# Patient Record
Sex: FEMALE | Race: ASIAN | NOT HISPANIC OR LATINO | Employment: UNEMPLOYED | URBAN - METROPOLITAN AREA
[De-identification: names, ages, dates, MRNs, and addresses within clinical notes are randomized per-mention and may not be internally consistent; named-entity substitution may affect disease eponyms.]

---
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
## 2020-01-02 DIAGNOSIS — N94.6 DYSMENORRHEA IN ADOLESCENT: ICD-10-CM

## 2020-01-03 RX ORDER — NORGESTIMATE AND ETHINYL ESTRADIOL
KIT
Qty: 28 TABLET | Refills: 0 | Status: SHIPPED | OUTPATIENT
Start: 2020-01-03 | End: 2020-01-27

## 2020-01-27 DIAGNOSIS — N94.6 DYSMENORRHEA IN ADOLESCENT: ICD-10-CM

## 2020-01-27 RX ORDER — NORGESTIMATE AND ETHINYL ESTRADIOL
KIT
Qty: 28 TABLET | Refills: 0 | Status: SHIPPED | OUTPATIENT
Start: 2020-01-27 | End: 2020-02-24

## 2020-01-29 DIAGNOSIS — N94.6 DYSMENORRHEA IN ADOLESCENT: ICD-10-CM

## 2020-01-29 RX ORDER — NORGESTIMATE AND ETHINYL ESTRADIOL
KIT
Qty: 28 TABLET | Refills: 0 | OUTPATIENT
Start: 2020-01-29

## 2020-02-24 DIAGNOSIS — N94.6 DYSMENORRHEA IN ADOLESCENT: ICD-10-CM

## 2020-02-24 RX ORDER — NORGESTIMATE AND ETHINYL ESTRADIOL
KIT
Qty: 28 TABLET | Refills: 0 | Status: SHIPPED | OUTPATIENT
Start: 2020-02-24 | End: 2020-03-25 | Stop reason: SDUPTHER

## 2020-03-24 DIAGNOSIS — N94.6 DYSMENORRHEA IN ADOLESCENT: ICD-10-CM

## 2020-03-25 RX ORDER — NORGESTIMATE AND ETHINYL ESTRADIOL
KIT
Qty: 28 TABLET | Refills: 0 | Status: SHIPPED | OUTPATIENT
Start: 2020-03-25 | End: 2020-04-20

## 2020-04-20 DIAGNOSIS — N94.6 DYSMENORRHEA IN ADOLESCENT: ICD-10-CM

## 2020-04-20 RX ORDER — NORGESTIMATE AND ETHINYL ESTRADIOL
KIT
Qty: 90 TABLET | Refills: 1 | Status: SHIPPED | OUTPATIENT
Start: 2020-04-20 | End: 2020-10-05

## 2020-10-03 DIAGNOSIS — N94.6 DYSMENORRHEA IN ADOLESCENT: ICD-10-CM

## 2020-10-05 RX ORDER — NORGESTIMATE AND ETHINYL ESTRADIOL
KIT
Qty: 28 TABLET | Refills: 0 | Status: SHIPPED | OUTPATIENT
Start: 2020-10-05 | End: 2020-11-02 | Stop reason: SDUPTHER

## 2020-10-29 DIAGNOSIS — N94.6 DYSMENORRHEA IN ADOLESCENT: Primary | ICD-10-CM

## 2020-11-02 RX ORDER — NORGESTIMATE AND ETHINYL ESTRADIOL
KIT
Qty: 90 TABLET | Refills: 3 | Status: SHIPPED | OUTPATIENT
Start: 2020-11-02 | End: 2021-10-04

## 2020-11-02 NOTE — TELEPHONE ENCOUNTER
Caller: Loni Ortiz    Relationship: Self    Best call back number:[phone]  Medication needed:   Requested Prescriptions     Pending Prescriptions Disp Refills   • Tri-Lo-Sheridan 0.18/0.215/0.25 MG-25 MCG per tablet [Pharmacy Med Name: TRI-LO-SHERIDAN TABLET] 28 tablet 0     Sig: TAKE ONE TABLET BY MOUTH DAILY       When do you need the refill by: ASAP    What details did the patient provide when requesting the medication: OUT OF MEDICATION       Does the patient have less than a 3 day supply:  [x] Yes  [] No    What is the patient's preferred pharmacy: [facility] [address] [facility] [address] - [phone]  - [phone] FX

## 2021-10-01 DIAGNOSIS — N94.6 DYSMENORRHEA IN ADOLESCENT: ICD-10-CM

## 2021-10-04 RX ORDER — NORGESTIMATE AND ETHINYL ESTRADIOL
KIT
Qty: 28 TABLET | Refills: 11 | Status: SHIPPED | OUTPATIENT
Start: 2021-10-04 | End: 2022-10-17 | Stop reason: SDUPTHER

## 2021-10-05 ENCOUNTER — OFFICE VISIT (OUTPATIENT)
Dept: FAMILY MEDICINE CLINIC | Facility: CLINIC | Age: 21
End: 2021-10-05

## 2021-10-05 VITALS
OXYGEN SATURATION: 98 % | TEMPERATURE: 98.6 F | WEIGHT: 136 LBS | SYSTOLIC BLOOD PRESSURE: 122 MMHG | BODY MASS INDEX: 25.03 KG/M2 | HEIGHT: 62 IN | HEART RATE: 72 BPM | DIASTOLIC BLOOD PRESSURE: 80 MMHG | RESPIRATION RATE: 18 BRPM

## 2021-10-05 DIAGNOSIS — Z00.00 ANNUAL PHYSICAL EXAM: Primary | ICD-10-CM

## 2021-10-05 DIAGNOSIS — Z11.1 VISIT FOR TB SKIN TEST: ICD-10-CM

## 2021-10-05 DIAGNOSIS — Z11.59 NEED FOR HEPATITIS C SCREENING TEST: ICD-10-CM

## 2021-10-05 DIAGNOSIS — Z11.8 SCREENING FOR CHLAMYDIAL DISEASE: ICD-10-CM

## 2021-10-05 PROBLEM — M30.0 POLYARTERITIS NODOSA: Status: ACTIVE | Noted: 2021-10-05

## 2021-10-05 PROBLEM — R01.1 HEART MURMUR: Status: ACTIVE | Noted: 2021-10-05

## 2021-10-05 PROCEDURE — 99395 PREV VISIT EST AGE 18-39: CPT | Performed by: NURSE PRACTITIONER

## 2021-10-05 NOTE — PROGRESS NOTES
"     Follow Up Office Note     Patient Name: Loni Ortiz  : 2000   MRN: [id number]     Chief Complaint:    Chief Complaint   Patient presents with   • Annual Exam       History of Present Illness: Loni Ortiz is a 20 y.o. female who presents today for annual physical exam.  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 She is also needing a TB skin test for employment. She has no complaints or health concerns today.  Patient describes her overall health as good.      Subjective      Review of Systems:   Review of Systems   Constitutional: Negative.    HENT: Negative.    Eyes: Negative.    Respiratory: Negative.    Cardiovascular: Negative.    Gastrointestinal: Negative.    Genitourinary: Negative.    Skin: Negative.    Allergic/Immunologic: Negative.    Neurological: Negative.    Hematological: Negative for adenopathy.   Psychiatric/Behavioral: Negative.  Negative for dysphoric mood and sleep disturbance. The patient is not nervous/anxious.         Past Medical History: History reviewed. No pertinent past medical history.      Medications:     Current Outpatient Medications:   •  albuterol (PROVENTIL HFA;VENTOLIN HFA) 108 (90 Base) MCG/ACT inhaler, Inhale 2 puffs Every 4 (Four) Hours As Needed for Wheezing (30 min prior to exercise)., Disp: 18 g, Rfl: 3  •  Tri-Lo-Sheridan 0.18/0.215/0.25 MG-25 MCG per tablet, TAKE ONE TABLET BY MOUTH DAILY, Disp: 28 tablet, Rfl: 11    Allergies:   No Known Allergies    Patient's last menstrual period was 2021 (approximate).  Objective     Physical Exam:  Vital Signs:   Vitals:    10/05/21 1135   BP: 122/80   Pulse: 72   Resp: 18   Temp: 98.6 °F (37 °C)   SpO2: 98%   Weight: 61.7 kg (136 lb)   Height: 157.5 cm (62\")     Body mass index is 24.87 kg/m².     Physical Exam  Vitals and nursing note reviewed. Exam conducted with a chaperone present.   Constitutional:       General: She is not in acute distress.     " Appearance: Normal appearance. She is well-developed and well-groomed. She is not ill-appearing, toxic-appearing or diaphoretic.   HENT:      Head: Normocephalic and atraumatic.      Right Ear: Tympanic membrane, ear canal and external ear normal.      Left Ear: Tympanic membrane, ear canal and external ear normal.      Nose: Nose normal.      Mouth/Throat:      Lips: Pink.      Mouth: Mucous membranes are moist.      Pharynx: Oropharynx is clear. Uvula midline. No posterior oropharyngeal erythema.   Eyes:      Pupils: Pupils are equal, round, and reactive to light.   Neck:      Thyroid: No thyroid mass, thyromegaly or thyroid tenderness.   Cardiovascular:      Rate and Rhythm: Normal rate and regular rhythm.      Pulses: Normal pulses.      Heart sounds: Normal heart sounds, S1 normal and S2 normal. No murmur heard.     Pulmonary:      Effort: Pulmonary effort is normal. No respiratory distress.      Breath sounds: Normal breath sounds.   Abdominal:      General: Bowel sounds are normal. There is no distension.      Palpations: Abdomen is soft.      Tenderness: There is no abdominal tenderness.   Musculoskeletal:         General: Normal range of motion.      Cervical back: Normal range of motion and neck supple.      Right lower leg: No edema.      Left lower leg: No edema.   Lymphadenopathy:      Cervical: No cervical adenopathy.   Skin:     General: Skin is warm and dry.      Capillary Refill: Capillary refill takes less than 2 seconds.      Findings: No rash.   Neurological:      General: No focal deficit present.      Mental Status: She is alert and oriented to person, place, and time.   Psychiatric:         Attention and Perception: Attention and perception normal.         Mood and Affect: Mood and affect normal.         Speech: Speech normal.         Behavior: Behavior normal. Behavior is cooperative.         Thought Content: Thought content normal.         Cognition and Memory: Cognition and memory normal.          Judgment: Judgment normal.         Assessment / Plan      Assessment/Plan:   Diagnoses and all orders for this visit:    1. Annual physical exam (Primary)  -     Comprehensive Metabolic Panel; Future  -     TSH; Future  -     CBC Auto Differential; Future  -     Lipid Panel; Future  -     POC Urinalysis Dipstick, Automated; Future        -     Physical form completed for patient-see scanned documents for details    2. Visit for TB skin test  -     TB Skin Test    3. Need for hepatitis C screening test  -     Hepatitis C Antibody; Future    4. Screening for chlamydial disease  -     OneSwab - Kit, Urine, Clean Catch; Future       Follow Up:   PRN and at next scheduled appointment(s) with PCP.    Discussed the nature of the medical condition(s) risks, complications, implications, management, safe and proper use of medications. Encouraged medication compliance, and keeping scheduled follow up appointments with me and any other providers.      RTC if symptoms fail to improve, to ER if symptoms worsen.      VIKTORIYA Rodriguez  [facility]       Please note that portions of this note may have been completed with a voice recognition program. Efforts were made to edit the dictations, but occasionally words are mistranscribed.

## 2021-10-07 PROBLEM — M30.0 POLYARTERITIS NODOSA: Status: RESOLVED | Noted: 2021-10-05 | Resolved: 2021-10-07

## 2021-10-07 LAB
INDURATION: 0 MM (ref 0–10)
Lab: NORMAL
Lab: NORMAL
TB SKIN TEST: NEGATIVE

## 2021-10-07 PROCEDURE — 86580 TB INTRADERMAL TEST: CPT | Performed by: NURSE PRACTITIONER

## 2021-10-07 NOTE — PATIENT INSTRUCTIONS
Health Maintenance, Female  Adopting a healthy lifestyle and getting preventive care are important in promoting health and wellness. Ask your health care provider about:  · The right schedule for you to have regular tests and exams.  · Things you can do on your own to prevent diseases and keep yourself healthy.  What should I know about diet, weight, and exercise?  Eat a healthy diet    · Eat a diet that includes plenty of vegetables, fruits, low-fat dairy products, and lean protein.  · Do not eat a lot of foods that are high in solid fats, added sugars, or sodium.    Maintain a healthy weight  Body mass index (BMI) is used to identify weight problems. It estimates body fat based on height and weight. Your health care provider can help determine your BMI and help you achieve or maintain a healthy weight.  Get regular exercise  Get regular exercise. This is one of the most important things you can do for your health. Most adults should:  · Exercise for at least 150 minutes each week. The exercise should increase your heart rate and make you sweat (moderate-intensity exercise).  · Do strengthening exercises at least twice a week. This is in addition to the moderate-intensity exercise.  · Spend less time sitting. Even light physical activity can be beneficial.  Watch cholesterol and blood lipids  Have your blood tested for lipids and cholesterol at 20 years of age, then have this test every 5 years.  Have your cholesterol levels checked more often if:  · Your lipid or cholesterol levels are high.  · You are older than 40 years of age.  · You are at high risk for heart disease.  What should I know about cancer screening?  Depending on your health history and family history, you may need to have cancer screening at various ages. This may include screening for:  · Breast cancer.  · Cervical cancer.  · Colorectal cancer.  · Skin cancer.  · Lung cancer.  What should I know about heart disease, diabetes, and high blood  pressure?  Blood pressure and heart disease  · High blood pressure causes heart disease and increases the risk of stroke. This is more likely to develop in people who have high blood pressure readings, are of  descent, or are overweight.  · Have your blood pressure checked:  ? Every 3-5 years if you are 18-39 years of age.  ? Every year if you are 40 years old or older.  Diabetes  Have regular diabetes screenings. This checks your fasting blood sugar level. Have the screening done:  · Once every three years after age 40 if you are at a normal weight and have a low risk for diabetes.  · More often and at a younger age if you are overweight or have a high risk for diabetes.  What should I know about preventing infection?  Hepatitis B  If you have a higher risk for hepatitis B, you should be screened for this virus. Talk with your health care provider to find out if you are at risk for hepatitis B infection.  Hepatitis C  Testing is recommended for:  · Everyone born from 1945 through 1965.  · Anyone with known risk factors for hepatitis C.  Sexually transmitted infections (STIs)  · Get screened for STIs, including gonorrhea and chlamydia, if:  ? You are sexually active and are younger than 24 years of age.  ? You are older than 24 years of age and your health care provider tells you that you are at risk for this type of infection.  ? Your sexual activity has changed since you were last screened, and you are at increased risk for chlamydia or gonorrhea. Ask your health care provider if you are at risk.  · Ask your health care provider about whether you are at high risk for HIV. Your health care provider may recommend a prescription medicine to help prevent HIV infection. If you choose to take medicine to prevent HIV, you should first get tested for HIV. You should then be tested every 3 months for as long as you are taking the medicine.  Pregnancy  · If you are about to stop having your period (premenopausal) and  you may become pregnant, seek counseling before you get pregnant.  · Take 400 to 800 micrograms (mcg) of folic acid every day if you become pregnant.  · Ask for birth control (contraception) if you want to prevent pregnancy.  Osteoporosis and menopause  Osteoporosis is a disease in which the bones lose minerals and strength with aging. This can result in bone fractures. If you are 65 years old or older, or if you are at risk for osteoporosis and fractures, ask your health care provider if you should:  · Be screened for bone loss.  · Take a calcium or vitamin D supplement to lower your risk of fractures.  · Be given hormone replacement therapy (HRT) to treat symptoms of menopause.  Follow these instructions at home:  Lifestyle  · Do not use any products that contain nicotine or tobacco, such as cigarettes, e-cigarettes, and chewing tobacco. If you need help quitting, ask your health care provider.  · Do not use street drugs.  · Do not share needles.  · Ask your health care provider for help if you need support or information about quitting drugs.  Alcohol use  · Do not drink alcohol if:  ? Your health care provider tells you not to drink.  ? You are pregnant, may be pregnant, or are planning to become pregnant.  · If you drink alcohol:  ? Limit how much you use to 0-1 drink a day.  ? Limit intake if you are breastfeeding.  · Be aware of how much alcohol is in your drink. In the [place] one drink equals one 12 oz bottle of beer (355 mL), one 5 oz glass of wine (148 mL), or one 1½ oz glass of hard liquor (44 mL).  General instructions  · Schedule regular health, dental, and eye exams.  · Stay current with your vaccines.  · Tell your health care provider if:  ? You often feel depressed.  ? You have ever been abused or do not feel safe at home.  Summary  · Adopting a healthy lifestyle and getting preventive care are important in promoting health and wellness.  · Follow your health care provider's instructions about healthy  diet, exercising, and getting tested or screened for diseases.  · Follow your health care provider's instructions on monitoring your cholesterol and blood pressure.  This information is not intended to replace advice given to you by your health care provider. Make sure you discuss any questions you have with your health care provider.  Document Revised: 12/11/2019 Document Reviewed: 12/11/2019  [employer] Patient Education © 2021 [employer]    Immunization Schedule, 19-21 Years Old  Vaccines are usually given at various ages, according to a schedule. Your health care provider will recommend vaccines for you based on your age, medical history, and lifestyle or other factors, such as travel or where you work.  You may receive vaccines as individual doses or as more than one vaccine together in one shot (combination vaccine). Talk with your health care provider about the risks and benefits of combination vaccines.  Before you get a vaccine:  Talk with your health care provider about which vaccines are right for you. This is especially important if:  · You are pregnant.  · You previously had a reaction after getting a vaccine.  · You have a weakened disease-fighting system (immune system). You may have a weakened immune system if you:  ? Are taking medicines that reduce (suppress) the activity of your immune system.  ? Are taking medicines to treat cancer (chemotherapy).  ? Have HIV or AIDS.  · You work in an environment where you may be exposed to a disease.  · You plan to travel outside of the country.  · You have a chronic illness, such as heart disease, kidney disease, diabetes, or lung disease.  Recommended immunizations for 19-21 years old  Influenza vaccine  This may also be called the IIV, TERESSA, or LAIV vaccine.  You should get a dose of the influenza vaccine every year.  Tetanus, diphtheria, and pertussis vaccine  This is also known as the Tdap vaccine.  You should get 1 dose of the Tdap vaccine if:  · You have  not previously gotten a Tdap vaccine.  · You do not know if you have ever gotten a Tdap vaccine.  Women should get 1 dose of the Tdap vaccine during each pregnancy. It is recommended that pregnant women receive this vaccine between 27 and 36 weeks of pregnancy.  You should get a dose of the tetanus and diphtheria vaccine, also called the Td vaccine, or the Tdap vaccine every 10 years.  Tetanus, diphtheria vaccine  This is also known as the Td vaccine.  You should get a dose of the Td vaccine or the Tdap vaccine every 10 years.  Measles, mumps, and rubella vaccine  This is also known as the MMR vaccine.  You may need to get the MMR vaccine if:  · You need to catch up on doses you missed in the past.  · You have not been given the vaccine before.  · You do not have evidence of immunity (by a blood test).  Pregnant women should not get the MMR vaccine during pregnancy because it may be harmful to the unborn baby. However, if you are not immune to measles, mumps, or rubella, you should get a dose of MMR vaccine within days after delivery.  Varicella vaccine  This is also known as the TOSHA vaccine.  You may need to get the TOSHA vaccine if:  · You need to catch up on doses you missed in the past.  · You have not been given the vaccine before.  · You do not have evidence of immunity (by a blood test).  · You have certain high-risk conditions, such as HIV or AIDS.  Pregnant women should not get the TOSHA vaccine during pregnancy because it may be harmful to the unborn baby. However, if you are not immune to chickenpox (varicella), you should get a dose of the TOSHA vaccine within days after delivery.  Human papillomavirus vaccine  This is also known as the HPV vaccine.  You should get the HPV vaccine if:  · You have not gotten the vaccine before. The vaccine is recommended for all adults through age 26.  · You need to catch up on doses you missed in the past.  Pneumococcal conjugate vaccine  This is also known as the PCV13  vaccine.  You should get the PCV13 vaccine as recommended if you have certain high-risk conditions. These include:  · Diabetes.  · Chronic conditions of the heart, lungs, or liver.  · Conditions that affect the immune system.  Pneumococcal polysaccharide vaccine  This is also known as the PPSV23 vaccine.  You should get the PPSV23 vaccine as recommended if you have certain high-risk conditions. These include:  · Diabetes.  · Chronic conditions of the heart, lungs, or liver.  · Conditions that affect the immune system.  Hepatitis A vaccine  This is also known as the HepA vaccine.  If you did not get the HepA vaccine previously, you should get it if:  · You are at risk for a hepatitis A infection. You may be at risk for infection if you:  ? Have chronic liver disease.  ? Have HIV or AIDS.  ? Are a man who has sex with men.  ? Use drugs.  ? Are homeless.  ? May be exposed to hepatitis A through work.  ? Travel to countries where hepatitis A is common.  ? Are pregnant.  ? Have or will have close contact with someone who was adopted from another country.  · You are not at risk for infection but want protection from hepatitis A.  Hepatitis B vaccine  This is also known as the HepB vaccine.  If you did not get the HepB vaccine previously, you should get it if:  · You are at risk for hepatitis B infection. You are at risk if you:  ? Have chronic liver disease.  ? Have HIV or AIDS.  ? Have sex with a partner who has hepatitis B, or:  § You have multiple sex partners.  § You are a man who has sex with men.  ? Use drugs.  ? May be exposed to hepatitis B through work.  ? Live with someone who has hepatitis B.  ? Receive dialysis treatment.  ? Have diabetes.  ? Travel to countries where hepatitis B is common.  ? Are pregnant.  · You are not at risk of infection but want protection from hepatitis B.  Meningococcal conjugate vaccine  This is also known as the MenACWY vaccine.  You may need to get the MenACWY vaccine if  you:  · Have not been given the vaccine before.  · Need to catch up on doses you missed in the past.  This vaccine is especially important if you:  · Do not have a spleen.  · Have sickle cell disease.  · Have HIV.  · Take medicines that suppress your immune system.  · Travel to countries where meningococcal disease is common.  · Are exposed to Neisseria meningitidis at work.  Serogroup B meningococcal vaccine  This is also known as the MenB vaccine.  You may need to get the MenB vaccine if you:  · Have not been given the vaccine before.  · Need to catch up on doses you missed in the past.  This vaccine is especially important if you:  · Do not have a spleen.  · Have sickle cell disease.  · Take medicines that suppress your immune system.  · Are exposed to Neisseria meningitidis at work.  Haemophilus influenzae type b vaccine  This is also known as the Hib vaccine.  Anyone older than 5 years is usually not given the Hib vaccine. However, if you have certain high-risk conditions, you may need to get this vaccine. These conditions include:  · Not having a spleen.  · Having received a stem cell transplant.  Where to find more information  Centers for Disease Control and Prevention: www.cdc.gov/vaccines  Summary  · Before you get a vaccine, tell your health care provider if you have reacted to vaccines in the past or have a condition that weakens your immune system.  · At 19-21 years, you should get a dose of the flu vaccine every year and a dose of the Td vaccine if 10 years have passed since you had a Tdap vaccine.  · Depending on your medical history and your risk factors, you may also need other vaccines. Ask your health care provider whether you are up to date on all your vaccines.  · Women who are pregnant may not receive certain vaccines. Ask your health care provider whether you should receive any vaccines soon after you deliver your baby.  This information is not intended to replace advice given to you by your  health care provider. Make sure you discuss any questions you have with your health care provider.  Document Revised: 02/02/2021 Document Reviewed: 02/02/2021  [employer] Patient Education © 2021 [employer]    Preventive Care 18-21 Years Old, Female  Preventive care refers to lifestyle choices and visits with your health care provider that can promote health and wellness. At this stage in your life, you may start seeing a primary care physician instead of a pediatrician. It is important to take responsibility for your health and well-being. Preventive care for young adults includes:  · A yearly physical exam. This is also called an annual wellness visit.  · Regular dental and eye exams.  · Immunizations.  · Screening for certain conditions.  · Healthy lifestyle choices, such as:  ? Eating a healthy diet.  ? Getting regular exercise.  ? Not using drugs or products that contain nicotine and tobacco.  ? Limiting alcohol use.  What can I expect for my preventive care visit?  Physical exam  Your health care provider may check your:  · Height and weight. These may be used to calculate your BMI (body mass index). BMI is a measurement that tells if you are at a healthy weight.  · Heart rate and blood pressure.  · Body temperature.  · Skin for abnormal spots.  Counseling  Your health care provider may ask you questions about your:  · Past medical problems.  · Family's medical history.  · Alcohol, tobacco, and drug use.  · Home life and relationship well-being.  · Access to firearms.  · Emotional well-being.  · Diet, exercise, and sleep habits.  · Sexual activity and sexual health.  · Method of birth control.  · Menstrual cycle.  · Pregnancy history.  What immunizations do I need?    Vaccines are usually given at various ages, according to a schedule. Your health care provider will recommend vaccines for you based on your age, medical history, and lifestyle or other factors, such as travel or where you work.  What tests do I  need?  Blood tests  · Lipid and cholesterol levels. These may be checked every 5 years starting at age 20.  · Hepatitis C test.  · Hepatitis B test.  Screening  · Pelvic exam and Pap test. This may be done every 3 years starting at age 21.  · STD (sexually transmitted disease) testing, if you are at risk.  · BRCA-related cancer screening. This may be done if you have a family history of breast, ovarian, tubal, or peritoneal cancers.  Other tests  · Tuberculosis skin test.  · Vision and hearing tests.  · Skin exam.  · Breast exam.  Talk with your health care provider about your test results, treatment options, and if necessary, the need for more tests.  Follow these instructions at home:  Eating and drinking    · Eat a healthy diet that includes fresh fruits and vegetables, whole grains, lean protein, and low-fat dairy products.  · Drink enough fluid to keep your urine pale yellow.  · Do not drink alcohol if:  ? Your health care provider tells you not to drink.  ? You are pregnant, may be pregnant, or are planning to become pregnant.  ? You are under the legal drinking age. In the [place] the legal drinking age is 21.  · If you drink alcohol:  ? Limit how much you use to 0-1 drink a day.  ? Be aware of how much alcohol is in your drink. In the [place] one drink equals one 12 oz bottle of beer (355 mL), one 5 oz glass of wine (148 mL), or one 1½ oz glass of hard liquor (44 mL).    Lifestyle  · Take daily care of your teeth and gums. Brush your teeth every morning and night with fluoride toothpaste. Floss one time each day.  · Stay active. Exercise for at least 30 minutes 5 or more days of the week.  · Do not use any products that contain nicotine or tobacco, such as cigarettes, e-cigarettes, and chewing tobacco. If you need help quitting, ask your health care provider.  · Do not use drugs.  · If you are sexually active, practice safe sex. Use a condom or other form of birth control (contraception) in order to prevent  pregnancy and STIs (sexually transmitted infections). If you plan to become pregnant, see your health care provider for a pre-conception visit.  · Find healthy ways to cope with stress, such as:  ? Meditation, yoga, or listening to music.  ? Journaling.  ? Talking to a trusted person.  ? Spending time with friends and family.  Safety  · Always wear your seat belt while driving or riding in a vehicle.  · Do not drive:  ? If you have been drinking alcohol. Do not ride with someone who has been drinking.  ? When you are tired or distracted.  ? While texting.  · Wear a helmet and other protective equipment during sports activities.  · If you have firearms in your house, make sure you follow all gun safety procedures.  · Seek help if you have been bullied, physically abused, or sexually abused.  · Use the Internet responsibly to avoid dangers, such as online bullying and online sex predators.  What's next?  · Go to your health care provider once a year for an annual wellness visit.  · Ask your health care provider how often you should have your eyes and teeth checked.  · Stay up to date on all vaccines.  This information is not intended to replace advice given to you by your health care provider. Make sure you discuss any questions you have with your health care provider.  Document Revised: 09/02/2020 Document Reviewed: 12/12/2019  [employer] Patient Education © 2021 [employer]    Tuberculin Skin Test  Why am I having this test?  The tuberculin skin test is used to check whether a person has been exposed to the bacteria that causes tuberculosis (TB) (Mycobacterium tuberculosis). Tuberculosis is a bacterial infection that usually affects the lungs but can affect other parts of the body. You may have a tuberculin skin test if:  · You have possible symptoms of TB, such as:  ? Coughing up blood, mucus from the lungs (sputum), or both.  ? A cough that lasts three weeks or longer.  ? Chest pain, or pain while breathing or  coughing.  ? Unexplained weight loss.  ? Fatigue and weakness.  ? Fever, sweating, and chills.  ? Loss of appetite.  · You are at high risk for getting TB. You may be at high risk if you:  ? Inject illegal drugs or share needles.  ? Have HIV or other diseases that affect the disease-fighting (immune) system.  ? Work in a health care facility.  ? Live in a high-risk community, such as a homeless shelter, nursing home, or correctional facility.  ? Have had contact with someone who has TB.  ? Are from or have traveled to a country where TB is common.  If you are at high risk, you may need to have regular TB screenings. TB screening may be required when starting a new job, such as becoming a health care worker or a teacher. Colleges or universities may require TB screening for new students.  What is being tested?  This test checks for the presence of TB antibodies in the body. Antibodies are a type of cell that is part of the body's immune system. After you get an infection, your body makes antibodies that stay in your body after you recover and protect you from getting the same infection again.  Tell a health care provider about:  · Any allergies you have.  · All medicines you are taking, including vitamins, herbs, eye drops, creams, and over-the-counter medicines.  · Any blood disorders you have.  · Any surgeries you have had.  · Any medical conditions you have.  · Whether you are pregnant or may be pregnant.  What happens during the test?    Your health care provider will inject a solution called PPD (purified protein derivative) under the first layer of skin on your arm. This causes a small, blister-like bump to form over the area temporarily. PPD is made from the bacteria that causes TB. PPD causes your immune system to react, but it does not get you sick with TB.  You may feel mild stinging as this happens. Afterward, the area may itch or burn.  How are the results reported?  To get your test results, you will  need to see your health care provider again within 2-3 days after you received the injection. It is important to follow your health care provider's instructions about when to be seen again. If you are not seen within 2-3 days, you may need to have the test repeated. At your follow-up visit, your health care provider will measure the area where the PPD was injected to see if the bump has gotten larger due to swelling. Your results will be reported as positive or negative:  · If the bump has disappeared or is small, your test result is negative. Negative means that you do not have the antibodies.  · If the bump is large, your test result is positive. Positive means that you have the antibodies. Swelling is caused by the antibodies reacting with the PPD. The skin may also turn red around the bump.  A false-positive result can occur. A false positive is incorrect because it means that a condition is present when it is not.  A false-negative result can occur. A false negative is incorrect because it means that a condition is not present when it is. False negatives are rare and are more likely to occur in older people and in people who have weakened immune systems.  What do the results mean?  A negative result means that it is unlikely that you have TB or that you have been exposed to TB bacteria. This test may be repeated, or you may have a blood test to check for TB. This is because your body may not react to the tuberculin skin test until several weeks after exposure to TB bacteria.  A positive result means that you have been exposed to TB, and you may need more tests to determine if you have:  · Active TB, also called TB disease. This means that you have TB symptoms and your infection can spread to others (you are contagious).  · Latent TB. This means that you do not have any symptoms of TB and you are not contagious. Latent TB can turn into active TB.  Talk with your health care provider about what your results  mean.  Questions to ask your health care provider  Ask your health care provider, or the department that is doing the test:  · When will my results be ready?  · How will I get my results?  · What are my treatment options?  · What other tests do I need?  · What are my next steps?  Summary  · The tuberculin skin test is used to check whether a person has been exposed to the bacteria that causes tuberculosis (TB).  · Your health care provider will inject a solution known as PPD (purified protein derivative) under the first layer of skin on your arm.  · After 2-3 days, your health care provider will measure the area where the PPD was injected to see if the bump has gotten larger due to swelling.  · Your results will be reported as positive or negative. A positive result means that you have been exposed to TB. A negative result means that it is unlikely that you have TB or that you have been exposed to TB bacteria.  This information is not intended to replace advice given to you by your health care provider. Make sure you discuss any questions you have with your health care provider.  Document Revised: 11/30/2018 Document Reviewed: 08/29/2018  Elsevier Patient Education © 2021 [employer]

## 2021-10-08 ENCOUNTER — TELEPHONE (OUTPATIENT)
Dept: FAMILY MEDICINE CLINIC | Facility: CLINIC | Age: 21
End: 2021-10-08

## 2021-10-08 NOTE — TELEPHONE ENCOUNTER
Caller: Loni Ortiz    Relationship: Self    Best call back number: [phone]     What is the best time to reach you: ANYTIME     Who are you requesting to speak with (clinical staff, provider,  specific staff member): CLINICAL STAFF     What was the call regarding: PATIENT IS WANTING TO KNOW IF SHE CAN COME BY TODAY BEFORE 3:00 AND GET A COPY OF HER IMMUNIZATION RECORDS     Do you require a callback: YES

## 2022-10-14 ENCOUNTER — TELEPHONE (OUTPATIENT)
Dept: FAMILY MEDICINE CLINIC | Facility: CLINIC | Age: 22
End: 2022-10-14

## 2022-10-14 NOTE — TELEPHONE ENCOUNTER
Caller: DianaLoni    Relationship: Self    Best call back number: [phone]    Requested Prescriptions:   Requested Prescriptions      No prescriptions requested or ordered in this encounter      Tri-Lo-Sheridan 0.18/0.215/0.25 MG-25 MCG per tablet ()    Pharmacy where request should be sent:     [facility] [id number] [address] AT [facility] [phone] [facility] [phone] FX      Additional details provided by patient:     ONLY TWO DAYS  LEFT    Does the patient have less than a 3 day supply:  [x] Yes  [] No    Latoya Munguia Rep   10/14/22 10:14 EDT             Normal vision: sees adequately in most situations; can see medication labels, newsprint

## 2022-10-17 DIAGNOSIS — N94.6 DYSMENORRHEA IN ADOLESCENT: ICD-10-CM

## 2022-10-17 RX ORDER — NORGESTIMATE AND ETHINYL ESTRADIOL 7DAYSX3 LO
1 KIT ORAL DAILY
Qty: 28 TABLET | Refills: 11 | Status: SHIPPED | OUTPATIENT
Start: 2022-10-17 | End: 2023-01-10 | Stop reason: SDUPTHER

## 2022-11-08 ENCOUNTER — OFFICE VISIT (OUTPATIENT)
Dept: FAMILY MEDICINE CLINIC | Facility: CLINIC | Age: 22
End: 2022-11-08

## 2022-11-08 VITALS
HEIGHT: 62 IN | RESPIRATION RATE: 20 BRPM | BODY MASS INDEX: 26.39 KG/M2 | SYSTOLIC BLOOD PRESSURE: 114 MMHG | HEART RATE: 80 BPM | TEMPERATURE: 98.7 F | WEIGHT: 143.4 LBS | DIASTOLIC BLOOD PRESSURE: 64 MMHG | OXYGEN SATURATION: 98 %

## 2022-11-08 DIAGNOSIS — J02.9 SORE THROAT: ICD-10-CM

## 2022-11-08 DIAGNOSIS — J11.1 INFLUENZA: Primary | ICD-10-CM

## 2022-11-08 DIAGNOSIS — R05.9 COUGH, UNSPECIFIED TYPE: ICD-10-CM

## 2022-11-08 DIAGNOSIS — J02.0 STREP PHARYNGITIS: ICD-10-CM

## 2022-11-08 DIAGNOSIS — Z20.828 EXPOSURE TO INFLUENZA: ICD-10-CM

## 2022-11-08 LAB
EXPIRATION DATE: ABNORMAL
INTERNAL CONTROL: ABNORMAL
Lab: ABNORMAL
S PYO AG THROAT QL: POSITIVE

## 2022-11-08 PROCEDURE — 87880 STREP A ASSAY W/OPTIC: CPT | Performed by: NURSE PRACTITIONER

## 2022-11-08 PROCEDURE — 99213 OFFICE O/P EST LOW 20 MIN: CPT | Performed by: NURSE PRACTITIONER

## 2022-11-08 RX ORDER — CEFDINIR 300 MG/1
300 CAPSULE ORAL 2 TIMES DAILY
Qty: 20 CAPSULE | Refills: 0 | Status: SHIPPED | OUTPATIENT
Start: 2022-11-08

## 2022-11-08 RX ORDER — BROMPHENIRAMINE MALEATE, PSEUDOEPHEDRINE HYDROCHLORIDE, AND DEXTROMETHORPHAN HYDROBROMIDE 2; 30; 10 MG/5ML; MG/5ML; MG/5ML
10 SYRUP ORAL EVERY 6 HOURS PRN
Qty: 400 ML | Refills: 0 | Status: SHIPPED | OUTPATIENT
Start: 2022-11-08 | End: 2022-11-18

## 2022-11-08 RX ORDER — OSELTAMIVIR PHOSPHATE 75 MG/1
75 CAPSULE ORAL 2 TIMES DAILY
Qty: 10 CAPSULE | Refills: 0 | Status: SHIPPED | OUTPATIENT
Start: 2022-11-08 | End: 2022-11-13

## 2022-11-08 NOTE — PROGRESS NOTES
"Chief Complaint  Cough, Sore Throat, URI, Headache, and Chills (Pt symptoms started Saturday. 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 )    Subjective          Loni Ortiz presents to [facility]  History of Present Illness  Patient is a 21-year-old female.  She is here for cough, sore throat, headache, chills.  She states her symptoms started on Saturday.  Patient does work at [email or web address] and was exposed to a patient with influenza a couple days prior.  She states her temperature on Sunday was 99.8.  And on Monday it was 99.0.  She has been taking over-the-counter products as needed.      The following portions of the patient's history were reviewed and updated as appropriate: allergies, current medications, past family history, past medical history, past social history, past surgical history and problem list.    Review of Systems   Constitutional: Positive for activity change, appetite change, fatigue and fever.   HENT: Positive for congestion and sore throat.    Eyes: Negative.    Respiratory: Positive for cough.    Cardiovascular: Negative.    Gastrointestinal: Negative.    Musculoskeletal: Positive for myalgias.   Skin: Negative.    Allergic/Immunologic: Positive for environmental allergies.   Neurological: Positive for headaches.   Hematological: Negative.    Psychiatric/Behavioral: Negative.          Objective   Vital Signs:   /64   Pulse 80   Temp 98.7 °F (37.1 °C) (Temporal)   Resp 20   Ht 157.5 cm (62.01\")   Wt 65 kg (143 lb 6.4 oz)   SpO2 98%   BMI 26.22 kg/m²               Physical Exam  Vitals reviewed.   Constitutional:       Appearance: She is ill-appearing.   HENT:      Head: Normocephalic.      Right Ear: Tympanic membrane, ear canal and external ear normal.      Left Ear: Tympanic membrane, ear canal and external ear normal.      Nose: Congestion present.      Mouth/Throat:      Mouth: Mucous membranes are moist.      " Pharynx: Posterior oropharyngeal erythema present.      Tonsils: No tonsillar exudate.   Eyes:      Pupils: Pupils are equal, round, and reactive to light.   Cardiovascular:      Rate and Rhythm: Normal rate and regular rhythm.      Pulses: Normal pulses.      Heart sounds: Normal heart sounds.   Abdominal:      General: Bowel sounds are normal.      Palpations: Abdomen is soft.   Skin:     General: Skin is warm and dry.      Capillary Refill: Capillary refill takes less than 2 seconds.   Neurological:      Mental Status: She is alert and oriented to person, place, and time.   Psychiatric:         Mood and Affect: Mood normal.         Behavior: Behavior normal.         Thought Content: Thought content normal.         Judgment: Judgment normal.        Result Review :            POC Rapid Strep A (11/08/2022 13:54)       Assessment and Plan    Diagnoses and all orders for this visit:    1. Influenza (Primary)  -     oseltamivir (TAMIFLU) 75 MG capsule; Take 1 capsule by mouth 2 (Two) Times a Day for 5 days.  Dispense: 10 capsule; Refill: 0    2. Exposure to influenza  -     oseltamivir (TAMIFLU) 75 MG capsule; Take 1 capsule by mouth 2 (Two) Times a Day for 5 days.  Dispense: 10 capsule; Refill: 0    3. Cough, unspecified type  -     brompheniramine-pseudoephedrine-DM (Bromfed DM) 30-2-10 MG/5ML syrup; Take 10 mL by mouth Every 6 (Six) Hours As Needed for Congestion, Cough or Allergies for up to 10 days.  Dispense: 400 mL; Refill: 0    4. Sore throat  -     POC Rapid Strep A    5. Strep pharyngitis  -     cefdinir (OMNICEF) 300 MG capsule; Take 1 capsule by mouth 2 (Two) Times a Day.  Dispense: 20 capsule; Refill: 0    Gargle warm salt water 1/4 teaspoon salt in 4 oz.warm water twice daily as needed.   Change toothbrush in 1 week   Avoid dairy products - this can increase your congestion.       Will treat for influenza due to exposure and symptoms.  Strep a was positive.  Will treat with antibiotics.  Follow-up if no  improvement.  Follow up with your PCP as needed.   Work note provided.      Follow Up   Return if symptoms worsen or fail to improve.  Patient was given instructions and counseling regarding her condition or for health maintenance advice. Please see specific information pulled into the AVS if appropriate.

## 2023-01-10 DIAGNOSIS — N94.6 DYSMENORRHEA IN ADOLESCENT: ICD-10-CM

## 2023-01-10 RX ORDER — NORGESTIMATE AND ETHINYL ESTRADIOL 7DAYSX3 LO
1 KIT ORAL DAILY
Qty: 28 TABLET | Refills: 11 | Status: SHIPPED | OUTPATIENT
Start: 2023-01-10 | End: 2024-01-10

## 2023-01-10 NOTE — TELEPHONE ENCOUNTER
Rx Refill Note  Requested Prescriptions     Pending Prescriptions Disp Refills   • norgestimate-ethinyl estradiol (Tri-Lo-Sheridan) 0.18/0.215/0.25 MG-25 MCG per tablet 28 tablet 11     Sig: Take 1 tablet by mouth Daily.      Last office visit with prescribing clinician: Visit date not found   Last telemedicine visit with prescribing clinician: Visit date not found   Next office visit with prescribing clinician: Visit date not found                         Would you like a call back once the refill request has been completed: [] Yes [] No    If the office needs to give you a call back, can they leave a voicemail: [] Yes [] No    Jeanie Kumar LPN  01/10/23, 08:31 EST

## 2023-03-29 DIAGNOSIS — N94.6 DYSMENORRHEA IN ADOLESCENT: ICD-10-CM

## 2023-03-30 NOTE — TELEPHONE ENCOUNTER
Rx Refill Note  Requested Prescriptions     Pending Prescriptions Disp Refills   • norgestimate-ethinyl estradiol (Tri-Lo-Sheridan) 0.18/0.215/0.25 MG-25 MCG per tablet 28 tablet 11     Sig: Take 1 tablet by mouth Daily.      Last office visit with prescribing clinician: Visit date not found   Last telemedicine visit with prescribing clinician: Visit date not found   Next office visit with prescribing clinician: Visit date not found                         Would you like a call back once the refill request has been completed: [] Yes [] No    If the office needs to give you a call back, can they leave a voicemail: [] Yes [] No    Nicol Olson MA  03/30/23, 08:32 EDT

## 2023-04-12 RX ORDER — NORGESTIMATE AND ETHINYL ESTRADIOL 7DAYSX3 LO
1 KIT ORAL DAILY
Qty: 28 TABLET | Refills: 11 | Status: SHIPPED | OUTPATIENT
Start: 2023-04-12 | End: 2024-04-11

## 2023-04-24 DIAGNOSIS — B37.31 YEAST INFECTION OF THE VAGINA: Primary | ICD-10-CM

## 2023-04-24 RX ORDER — FLUCONAZOLE 150 MG/1
150 TABLET ORAL ONCE
Qty: 1 TABLET | Refills: 0 | Status: SHIPPED | OUTPATIENT
Start: 2023-04-24 | End: 2023-04-24

## 2023-05-15 ENCOUNTER — OFFICE VISIT (OUTPATIENT)
Dept: FAMILY MEDICINE CLINIC | Facility: CLINIC | Age: 23
End: 2023-05-15
Payer: COMMERCIAL

## 2023-05-15 VITALS
RESPIRATION RATE: 18 BRPM | TEMPERATURE: 98.2 F | SYSTOLIC BLOOD PRESSURE: 100 MMHG | BODY MASS INDEX: 26.15 KG/M2 | HEART RATE: 75 BPM | OXYGEN SATURATION: 99 % | WEIGHT: 143 LBS | DIASTOLIC BLOOD PRESSURE: 60 MMHG

## 2023-05-15 DIAGNOSIS — R10.13 EPIGASTRIC PAIN: ICD-10-CM

## 2023-05-15 DIAGNOSIS — R10.31 RLQ ABDOMINAL PAIN: Primary | ICD-10-CM

## 2023-05-15 LAB
B-HCG UR QL: NEGATIVE
BILIRUB BLD-MCNC: NEGATIVE MG/DL
CLARITY, POC: CLEAR
COLOR UR: YELLOW
EXPIRATION DATE: NORMAL
EXPIRATION DATE: NORMAL
GLUCOSE UR STRIP-MCNC: NEGATIVE MG/DL
INTERNAL NEGATIVE CONTROL: NEGATIVE
INTERNAL POSITIVE CONTROL: POSITIVE
KETONES UR QL: NEGATIVE
LEUKOCYTE EST, POC: NEGATIVE
Lab: NORMAL
Lab: NORMAL
NITRITE UR-MCNC: NEGATIVE MG/ML
PH UR: 6 [PH] (ref 5–8)
PROT UR STRIP-MCNC: NEGATIVE MG/DL
RBC # UR STRIP: NEGATIVE /UL
SP GR UR: 1.01 (ref 1–1.03)
UROBILINOGEN UR QL: NORMAL

## 2023-05-15 NOTE — PROGRESS NOTES
Subjective   Loni Ortiz is a 22 y.o. female.     History of Present Illness   Epigastric abd pain woke from sleep at 9 am.  Some nausea no diarrhea.  No fever.  Ate some fruit at 10 am.  No sick contacts no sore throat.  No radiation of pain.  Sharp pain in central abd comes and goes.  Squeezing pain in epigastric area.  Not recently been taking ibuprofen.  Not worsened by food.  No st.    Answers for HPI/ROS submitted by the patient on 5/15/2023  What is the primary reason for your visit?: Abdominal Pain  Radiates to: epigastric region  weight loss: No    Decreased appetite no fever or chills feels like she is having normal bowel movements.  No melena or dark sticky stools    The following portions of the patient's history were reviewed and updated as appropriate: allergies, current medications, past family history, past medical history, past social history, past surgical history and problem list.    Review of Systems   Constitutional: Negative for fever.   Gastrointestinal: Positive for abdominal pain and nausea. Negative for anal bleeding, blood in stool, constipation, diarrhea and vomiting.   Genitourinary: Negative for dysuria, frequency and hematuria.   Musculoskeletal: Negative for arthralgias and myalgias.   Neurological: Negative for headaches.       Objective     Vitals:    05/15/23 1400   BP: 100/60   Pulse: 75   Resp: 18   Temp: 98.2 °F (36.8 °C)   SpO2: 99%   Weight: 64.9 kg (143 lb)       Physical Exam  Vitals and nursing note reviewed.   Constitutional:       Appearance: She is well-developed.   HENT:      Head: Normocephalic and atraumatic.   Eyes:      General:         Right eye: No discharge.         Left eye: No discharge.      Pupils: Pupils are equal, round, and reactive to light.   Cardiovascular:      Rate and Rhythm: Normal rate and regular rhythm.      Heart sounds: Normal heart sounds.   Pulmonary:      Effort: Pulmonary effort is normal.      Breath sounds: Normal breath sounds.   Abdominal:       General: Bowel sounds are normal. There is no distension.      Palpations: Abdomen is soft. There is no mass.      Tenderness: There is abdominal tenderness. There is guarding. There is no right CVA tenderness, left CVA tenderness or rebound.      Comments: She has tenderness to palpation around the umbilical area and radiating into the right lower quadrant.  Normal bowel sounds some guarding of the right lower quadrant.  No rebound.   Musculoskeletal:         General: Normal range of motion.      Right shoulder: No swelling.      Cervical back: Normal range of motion and neck supple.   Skin:     General: Skin is warm and dry.      Nails: There is no clubbing.   Neurological:      Mental Status: She is alert and oriented to person, place, and time.      Deep Tendon Reflexes: Reflexes are normal and symmetric.   Psychiatric:         Behavior: Behavior normal.         Thought Content: Thought content normal.         Judgment: Judgment normal.         Assessment & Plan     Problem List Items Addressed This Visit        Gastrointestinal Abdominal     Epigastric pain    RLQ abdominal pain - Primary    Relevant Orders    CT Abdomen Pelvis With Contrast    POC Urinalysis Dipstick, Automated    POC Pregnancy, Urine    CBC & Differential    Comprehensive Metabolic Panel    H.pylori,IgG / IgA Antibodies     We will get a CT scan abdomen pelvis to rule out appendicitis.  We will get a urine in the office today and I have ordered the CT scan stat we will get some labs to make sure white count and liver function tests are normal.

## 2024-05-13 ENCOUNTER — TELEPHONE (OUTPATIENT)
Dept: FAMILY MEDICINE CLINIC | Facility: CLINIC | Age: 24
End: 2024-05-13

## 2024-06-24 ENCOUNTER — E-VISIT (OUTPATIENT)
Dept: FAMILY MEDICINE CLINIC | Facility: TELEHEALTH | Age: 24
End: 2024-06-24
Payer: COMMERCIAL

## 2024-06-24 PROCEDURE — BRIGHTMDVISIT: Performed by: NURSE PRACTITIONER

## 2024-06-24 NOTE — E-VISIT TREATED
Chief Complaint: Anxiety, Depression, Stress   Patient introduction   Patient is 23-year-old female presenting with mood symptoms. Patient has had current symptoms for less than a year. No recent unusual stress from their home situation, family, personal relationships, work, finances, or current news and events.   Depression screening   PHQ-9. Response options are: Not at all (0), On several days (1), More than half the days (2), or Nearly every day (3).   Over the past 2 weeks, patient has been bothered:    (0) Not at all by having little interest or pleasure in doing things    (0) Not at all by depressed mood    (0) Not at all by sleep disturbance    (0) Not at all by fatigue or lethargy    (0) Not at all by change in appetite    (0) Not at all by feelings of worthlessness or excessive guilt    (0) Not at all by poor concentration    (0) Not at all by observable restlessness or slowness in movement    (0) Not at all by thoughts of hurting themselves or that they would be better off dead   Score: 0. Interpretation: 0 to 4: None to minimal. 5 to 9: Mild depression. 10 to 14: Major Depressive Disorder, Mild. 15 to 19: Major Depressive Disorder, Moderately Severe. 20 to 27: Major Depressive Disorder, Severe.   Anxiety screening   MARCEL-7. Response options are: Not at all (0), On several days (1), More than half the days (2), or Nearly every day (3)   Over the past 2 weeks, patient has been bothered:    (0) Not at all by feeling nervous, anxious, or on edge    (0) Not at all by not being able to stop or control worrying    (0) Not at all by worrying too much about different things    (0) Not at all by having trouble relaxing    (0) Not at all by being so restless that it is hard to sit still    (0) Not at all by becoming easily annoyed or irritable    (0) Not at all by feeling afraid, as if something awful might happen   Score: 0. Interpretation: 0 to 4: None to minimal. 5 to 9: Mild anxiety. 10 to 14: Moderate  anxiety. 15 to 21: Severe anxiety.   Suicide risk screening   Score: Negative screen (based on PHQ-9 responses above).   Action taken based on risk:    Negative screen: Patient completed interview.    Low risk: Patient completed interview. Follow-up per provider discretion.    Moderate risk: Recommended to call [phone] or [phone] or to go to their nearest ER. Patient given option to continue with the interview if those options are not relevant at this time. Follow-up per provider discretion.    High risk: Interview terminated. Recommended to go to ER or to call 911 or [phone].   Repetitive thoughts and behaviors screening   DSM-5 Level 1 Cross-Cutting Symptom Measure, Section X. 2 items. Response options are: Not at all (0), Rarely (1), Several days (2), More than half the days (3), or Nearly every day (4)   Over the past 2 weeks, patient has been bothered:    (0) Not at all by unpleasant thoughts, urges, or images that repeatedly enter their mind    (0) Not at all by feeling driven to repeat certain behaviors or mental acts   Score: 0. Interpretation: 0 to 2 (with 0 to 1 on both items): Negative screen. 2 or higher (with 2 or higher on either item): Positive screen.   Manisha/hypomania screening   DSM-5 Level 1 Cross-Cutting Symptom Measure, Section III. 2 items. Response options are: Not at all (0), Rarely (1), Several days (2), More than half the days (3), or Nearly every day (4)   Over the past 2 weeks, patient has been bothered:    (0) Not at all by sleeping less than usual, but still having a lot of energy    (0) Not at all by starting lots more projects than usual or doing more risky things than usual   Score: 0. Interpretation: 0 to 2 (with 1 on both items): Negative screen. 2 or higher (with 2 or higher on at least 1 item): Positive screen; in-interview follow-up with Hal Self-Rating Manisha (ASRM) Scale.   Psychosis/hallucination screening   DSM-5 Level 1 Cross-Cutting Symptom Measure, Section VII. 2 items. Response  "options are: Not at all (0), Rarely (1), Several days (2), More than half the days (3), or Nearly every day (4)   Over the past 2 weeks, patient has been bothered:    (0) Not at all by hearing things other people could not hear    (0) Not at all by feeling that someone could hear their thoughts   Score: 0. Interpretation: 0: Negative screen. 1 or higher: Positive screen.   Substance abuse screening   DSM-5 Level 1 Cross-Cutting Symptom Measure, Section XIII. 2 items on use of tobacco, recreational drugs, or prescription medications beyond the amount prescribed or duration of prescription.   Over the past 2 weeks, patient:    (0) Did not use tobacco    (0) Did not use a recreational or prescription drug on their own   Score: 0. Interpretation: 0 is a negative screen. 1 or higher with positive response for prescription/recreational drug abuse leads to follow-up with Level 2 Cross-Cutting Symptom Measure, Section XIII. 1 or higher with positive response for tobacco use leads to tobacco cessation advice in AVS.   Past mental health history   No history of depression, generalized anxiety disorder, panic attacks, PTSD, OCD, bipolar disorder, or schizophrenia or schizoaffective disorder.   Family history of mental health disorders   No known family history of depression, generalized anxiety disorder, panic attacks, PTSD, OCD, bipolar disorder, schizophrenia/schizoaffective disorder, substance use disorder, or suicide/suicide attempt.   Current mental health treatment   Patient is not currently taking medication for any mental health condition. Patient is not currently in counseling or therapy. Patient is not currently being seen by a psychiatrist and has not been seen by one in the last 2 years.   Previous mental health treatment   No history of diagnosis with any mental health condition.   Patient has not taken medication for any mental health condition in the past.   Vital signs    Height: 5' 2\"    Weight: 140 lbs   " Current medications   Currently taking albuterol sulfate  (90 Base) MCG/ACT inhaler.   Medication allergies   None.   Medication contraindications   Past medical history   No history of asthma, cancer, chronic pain, congestive heart failure, coronary artery disease, diabetes, epilepsy, hypertension, inflammatory arthritis, kidney disease or history of kidney function problems, lupus, multiple sclerosis, Parkinson disease, thyroid disorder, or viral hepatitis.   Patient-submitted comments   Patient was asked if they had anything to add about their symptoms. Patient writes: I am having severe testing anxiety.   Patient is willing to try medication as part of their treatment plan.   Patient did not request an excuse note.   Assessment   Adjustment disorder, unspecified (reaction to severe stress).   This diagnosis is based on review of patient interview responses and other available clinical information.    PHQ-9 depression screening score: 0. Interpretation: 0 to 4: None to minimal.    MARCEL-7 generalized anxiety screening score: 0. Interpretation: 0 to 4: None to minimal.   Suicide risk severity screening was negative.   Screening results show low likelihood of hubert/hypomania and psychosis.   Plan   Medications:   No medications prescribed.   Orders:    Referral to behavioral health. Additional note: [facility] [place] [facility]   Education:    Condition and causes    Treatment and self-care    Possible medication side effects    When to call provider   ----------   Electronically signed by VIKTORIYA Silva on 2024-06-24 at 12:51PM   ----------   Patient Interview Transcript:   Have you ever been diagnosed with any of these mental health conditions? Select all that apply.    None of the above   Not selected:    Depression    Generalized anxiety disorder (MARCEL)    Panic attacks    Post traumatic stress disorder (PTSD)    Obsessive-compulsive disorder (OCD)    Bipolar disorder     Schizophrenia or schizoaffective disorder    A mental health condition not listed here (specify)   Are you currently taking medication for any mental health condition? Select one.    No   Not selected:    Yes   Have you taken medication for any mental health condition in the past? Select one.    No   Not selected:    Yes   Have you recently experienced unusual stress from any of these? Select all that apply.    None of the above   Not selected:    Personal relationships    Home situation    Family    Work    Finances    Current news and events   Are you currently in counseling or therapy? Select one.    No   Not selected:    Yes   Are you currently being seen by a psychiatrist, or have you been seen by a psychiatrist in the last 2 years? Select one.    No   Not selected:    Yes, currently    Yes, within the last 2 years   Do any of these apply to you? Select all that apply.    None of the above   Not selected:    I'm pregnant    I've given birth in the past 12 months    I'm breastfeeding   Do you have any of these medical conditions? Scroll to see all options. Select all that apply.    None of the above   Not selected:    Asthma    Cancer    Chronic pain    Congestive heart failure    Coronary artery disease (blocked arteries in the heart)    Diabetes    Epilepsy    High blood pressure    Inflammatory arthritis    Kidney disease or history of kidney function problems    Lupus (SLE)    Multiple sclerosis    Parkinson disease    Thyroid disorder    Viral hepatitis   Do any of these apply to your first-degree blood relatives? First-degree blood relatives include parents, siblings, and children who you're related to by birth, not by marriage or adoption. Select all that apply.    No, not that I know of   Not selected:    Depression    Generalized anxiety disorder (MARCEL)    Panic attacks    Post traumatic stress disorder (PTSD)    Obsessive-compulsive disorder (OCD)    Bipolar disorder    Schizophrenia or schizoaffective  disorder    Drug or alcohol addiction (substance use disorder)     by suicide    Attempted suicide   1. Over the past 2 weeks, how often have you been bothered by: Having little interest or pleasure in doing things Select one.    Not at all   Not selected:    Several days    More than half the days    Nearly every day   2. Over the past 2 weeks, how often have you been bothered by: Feeling down, depressed, or hopeless Select one.    Not at all   Not selected:    Several days    More than half the days    Nearly every day   3. Over the past 2 weeks, how often have you been bothered by: Trouble falling or staying asleep, or sleeping too much Select one.    Not at all   Not selected:    Several days    More than half the days    Nearly every day   4. Over the past 2 weeks, how often have you been bothered by: Feeling tired or having little energy Select one.    Not at all   Not selected:    Several days    More than half the days    Nearly every day   5. Over the past 2 weeks, how often have you been bothered by: Poor appetite or overeating Select one.    Not at all   Not selected:    Several days    More than half the days    Nearly every day   6. Over the past 2 weeks, how often have you been bothered by: Feeling bad about yourself, that you're a failure, or that you've let yourself or friends and family down Select one.    Not at all   Not selected:    Several days    More than half the days    Nearly every day   7. Over the past 2 weeks, how often have you been bothered by: Trouble concentrating on things like watching TV or reading the news Select one.    Not at all   Not selected:    Several days    More than half the days    Nearly every day   8. Over the past 2 weeks, how often have you been bothered by: Moving or speaking so slowly that other people could have noticed OR Being so fidgety or restless that you have been moving around a lot more than usual Select one.    Not at all   Not selected:    Several  days    More than half the days    Nearly every day   9. Over the past 2 weeks, how often have you been bothered by: Thoughts that you'd be better off dead or thoughts of hurting yourself Select one.    Not at all   Not selected:    Several days    More than half the days    Nearly every day   1. Over the past 2 weeks, how often have you been bothered by: Feeling nervous, anxious, or on edge? Select one.    Not at all   Not selected:    Several days    More than half the days    Nearly every day   2. Over the past 2 weeks, how often have you been bothered by: Not being able to stop or control worrying? Select one.    Not at all   Not selected:    Several days    More than half the days    Nearly every day   3. Over the past 2 weeks, how often have you been bothered by: Worrying too much about different things? Select one.    Not at all   Not selected:    Several days    More than half the days    Nearly every day   4. Over the past 2 weeks, how often have you been bothered by: Having trouble relaxing? Select one.    Not at all   Not selected:    Several days    More than half the days    Nearly every day   5. Over the past 2 weeks, how often have you been bothered by: Being so restless that it's hard to sit still? Select one.    Not at all   Not selected:    Several days    More than half the days    Nearly every day   6. Over the past 2 weeks, how often have you been bothered by: Becoming easily annoyed or irritable? Select one.    Not at all   Not selected:    Several days    More than half the days    Nearly every day   7. Over the past 2 weeks, how often have you been bothered by: Feeling afraid, as if something awful might happen? Select one.    Not at all   Not selected:    Several days    More than half the days    Nearly every day   Over the past 2 weeks, how often have you been bothered by: Sleeping less than usual, but still having a lot of energy? Select one.    Not at all   Not selected:    1 to 2 days    " Several days    More than half the days    Nearly every day   Over the past 2 weeks, how often have you been bothered by: Starting lots more projects than usual or doing more risky things than usual? Select one.    Not at all   Not selected:    1 to 2 days    Several days    More than half the days    Nearly every day   Over the past 2 weeks, how often have you been bothered by: Hearing things other people couldn't hear, such as voices even when no one was around? Select one.    Not at all   Not selected:    1 to 2 days    Several days    More than half the days    Nearly every day   Over the past 2 weeks, how often have you been bothered by: Feeling that someone could hear your thoughts, or that you could hear what another person was thinking? Select one.    Not at all   Not selected:    1 to 2 days    Several days    More than half the days    Nearly every day   Over the past 2 weeks, how often have you been bothered by: Unpleasant thoughts, urges, or images that repeatedly enter your mind? Select one.    Not at all   Not selected:    1 to 2 days    Several days    More than half the days    Nearly every day   Over the past 2 weeks, how often have you been bothered by: Feeling driven to perform certain behaviors or mental acts over and over again? Select one.    Not at all   Not selected:    1 to 2 days    Several days    More than half the days    Nearly every day   In the past year, how often did you have a drink containing alcohol? Select one.    Never   Not selected:    Monthly or less    _2 to 4 times per month _    2 to 3 times per week    4 or more times per week   Over the past 2 weeks, how often have you: Smoked any cigarettes, smoked a cigar or pipe, or used snuff or chewing tobacco? Select one.    Not at all   Not selected:    1 to 2 days    Several days    More than half the days    Nearly every day   Over the past 2 weeks, how often did you use any of these on your own? \"On your own\" means without a " "doctor's prescription, or more than prescribed, or longer than prescribed. - Prescription painkillers, such as Vicodin - Stimulants, such as Ritalin or Adderall - Sedatives or tranquilizers, such as sleeping pills or Valium - Marijuana - Cocaine or crack - Club drugs, such as Ecstasy - Hallucinogens, such as LSD - Heroin - Inhalants or solvents, such as glue - Methamphetamines, such as speed Select one.    Not at all   Not selected:    1 to 2 days    Several days    More than half the days    Nearly every day   Think about all of the symptoms you've shared with us today. How long have you been feeling this way? Select one.    Less than a year   Not selected:    More than a year    I'm not sure   These last few questions help us make sure your treatment plan is safe for you. Do you have any of these conditions? Select all that apply.    None of these   Not selected:    Uncorrected or persistent electrolyte abnormalities, such as potassium, sodium, calcium or magnesium    QT prolongation    Congenital long QT syndrome (LQTS)    Ventricular arrhythmias, such as ventricular fibrillation or ventricular tachycardia    Bradycardia (low heart rate)    Recent heart attack    Congestive heart failure (CHF)   Do any of these apply to you now or in the recent past? \"Cold turkey\" here means stopping a medication suddenly rather than slowly taking lower and lower doses until you're off the medication. Select all that apply.    None of these   Not selected:    Seizure disorder    Bulimia or anorexia    Liver disease    Stopped using alcohol \"cold turkey\"    Stopped using a sedative \"cold turkey\"    Stopped using an anti-seizure drug \"cold turkey\"    Stopped using a benzodiazepine drug (Klonopin, Valium, Ativan, Xanax) \"cold turkey\"   Do any of the following apply to you? Select all that apply.    None of these   Not selected:    I'm currently taking pimozide    I'm currently taking thioridazine    I've taken an MAO inhibitor in " the last 14 days    I've taken linezolid or IV methylene blue in the last 14 days   Are you still taking these medications listed in your medical record? If you're not taking any of these, click Next. Select all that apply.    albuterol sulfate  (90 Base) MCG/ACT inhaler   Are you taking any other medications, vitamins, or supplements? Select one.    No   Not selected:    Yes   Have you ever had an allergic or bad reaction to any medication? Select one.    No   Not selected:    Yes   If medication is recommended as part of your treatment plan, is that something you're willing to try? Select one.    Yes   Not selected:    No   Knowing your Body Mass Index (BMI) can help your provider choose the best medication for you. To determine your BMI, we need to know your height and weight. Enter your height.    Height   Enter your weight (in pounds).    Weight   Do you need a doctor's note? A doctor's note confirms that you received care today and states when you can return to school or work. It does not contain information about your diagnosis or treatment plan. Your provider will make the final decision on whether to give you a doctor's note. Doctor's notes CANNOT be backdated. Select one.    No   Not selected:    Today only (1 day)    Today and tomorrow (2 days)    3 days   Is there anything you'd like to add about your symptoms? Please limit your comments to the symptoms covered in this interview. If you include comments about other concerns, your provider may recommend that you be seen in person.    I am having severe testing anxiety   ----------   Medical history   The following information was received from the EMR on June 24, 2024.   Allergies:    No Known Allergies   - Allergy Type:   - Reaction:   - Severity: None   - Clinical Status: Active   - Verification Status: Confirmed   Medications:    norgestimate-ethinyl estradiol (ORTHO TRI-CYCLEN LO) triphasic tablets   - Route: Oral   - Start Date: July 11,  2023   - End Date: None   - Status: Active    albuterol (PROVENTIL HFA;VENTOLIN HFA;PROAIR HFA) inhaler   - Route: Inhalation   - Start Date: December 19, 2017   - End Date: None   - Status: Active   Problem list:    SALINAS (dyspnea on exertion)   - Category: Problem List Item   - Health Status:   - Start Date: December 19, 2017   - End Date: None   - Status: Active    Exercise-induced asthma   - Category: Problem List Item   - Health Status:   - Start Date: December 19, 2017   - End Date: None   - Status: Active    Dysmenorrhea   - Category: Problem List Item   - Health Status:   - Start Date: January 23, 2018   - End Date: None   - Status: Active    Dysmenorrhea in adolescent   - Category: Problem List Item   - Health Status:   - Start Date: April 16, 2018   - End Date: None   - Status: Active    Acne   - Category: Problem List Item   - Health Status:   - Start Date: April 16, 2018   - End Date: None   - Status: Active    Encounter for well child check without abnormal findings   - Category: Problem List Item   - Health Status:   - Start Date: August 27, 2018   - End Date: None   - Status: Active    Needs flu shot   - Category: Problem List Item   - Health Status:   - Start Date: January 02, 2019   - End Date: None   - Status: Active    Need for prophylactic vaccination against hepatitis A   - Category: Problem List Item   - Health Status:   - Start Date: January 22, 2019   - End Date: None   - Status: Active    Need for HPV vaccination   - Category: Problem List Item   - Health Status:   - Start Date: January 22, 2019   - End Date: None   - Status: Active    Need for meningococcus vaccine   - Category: Problem List Item   - Health Status:   - Start Date: January 22, 2019   - End Date: None   - Status: Active    Heart murmur   - Category: Problem List Item   - Health Status:   - Start Date: October 05, 2021   - End Date: None   - Status: Active    Polyarteritis nodosa   - Category: Problem List Item   - Health  Status:   - Start Date: October 05, 2021   - End Date: October 07, 2021   - Status: Resolved    Epigastric abdominal pain   - Category: Problem List Item   - Health Status:   - Start Date: May 15, 2023   - End Date: None   - Status: Active    Epigastric pain   - Category: Problem List Item   - Health Status:   - Start Date: May 15, 2023   - End Date: None   - Status: Active    RLQ abdominal pain   - Category: Problem List Item   - Health Status:   - Start Date: May 15, 2023   - End Date: None   - Status: Active

## 2024-06-24 NOTE — EXTERNAL PATIENT INSTRUCTIONS
Note   I have placed a referral for you to virtual behavorial health to discuss symptoms and note needed for class. Our scheduling center will contact you to make an appointment usually within 1 week. You may also reach them at [phone].   Diagnosis   Reaction to Severe Stress   My name is VIKTORIYA Silva. I'm a healthcare provider at [facility]. I've reviewed your interview, and I see that you have some common symptoms related to stress. I'm glad you reached out.    When you experience more stress than is expected in reaction to a difficult situation, it can cause problems in your life. Learning how to manage that stress can help you cope and feel and act more like yourself.    Stress management can include counseling, coaching, consultations, or various digital tools. In making the recommendations below, I've considered your symptoms, current situation, medical history, and previous treatments, if any.    Please follow up with your provider in a few weeks. They'll check how you're doing and adjust your treatment plan if necessary.    In addition to your recommended treatment plan, there are things you can do to help yourself feel better. Even small actions and lifestyle changes can make a big difference. Try some of the suggestions in the Other treatment section below.   Orders and referrals   I've included a referral for therapy in your treatment plan. Someone will contact you to schedule an appointment for counseling or therapy.   About your diagnosis   Stress is a normal part of life. But when you experience severe stress, it can be hard to manage and adjust to it. You may have behavioral or emotional reactions and symptoms of depression and anxiety. These can affect your ability to go about your daily life.   Usually, people adjust to stressful life changes within a few months. Reactions to severe stress may last up to 6 months after the stressor has ended. It's a health condition that affects  how you think and function, and can even affect your self-esteem. Sometimes stress can also cause physical symptoms.   Common reactions to severe stress include:    Feeling sad, hopeless, or not enjoying things you used to enjoy    Frequent crying    Worrying or feeling anxious, nervous, jittery, or stressed out    Changes in sleep or eating patterns    Feeling overwhelmed    Difficulty functioning in daily activities    Withdrawing from social supports    Difficulty sleeping or concentrating    Avoiding important things such as going to work or paying bills    Recurrent thoughts of death or suicide   What to expect   Counseling and talk therapy   Counseling or therapy teaches you new coping skills and more adaptive ways of thinking about problems. These tools can help you make positive changes. The benefits of counseling often last long after treatment sessions have stopped.   Most people experiencing severe stress don't need medication, and can be treated with counseling, coaching, or other types of stress management.   When to seek care   If you feel like harming yourself or others, call 911 right away.   The [employer] Suicide and Crisis Lifeline is also available. You can dial [phone]   to call the lifeline. You can also connect with a counselor using their online chat  .   Call us at [phone]   with any sudden or unexpected symptoms.    New or worsening depression symptoms    New or worsening anxiety symptoms    Feeling extremely agitated or restless    Panic attacks    Worsening insomnia    New or worsening irritability    Inappropriate aggression, anger, or violence    Dangerous impulses    Extreme increase in activity or talking    Other unusual changes in behavior, mood, thoughts, or feeling   Other treatment   The tips below may help you feel better while you start your treatment plan:   Self-care    Stress can make self-care hard, but taking action can help you feel better.    Make a list of activities that  "usually improve your mood. When you're feeling down, try doing one of those activities, even for a few minutes.    Be kind to yourself. Don't get down on yourself if you don't reach a goal. Be willing to try again.    Try to eat on a regular schedule. Blood sugar levels can affect mood.   Exercise    Physical exercise has an especially positive effect on relieving stress. If you're able to, try walking 30 minutes a day, 3 to 5 times a week. If that sounds like too much, challenge yourself to start walking for just 10 minutes a day. If walking is not for you, find another activity. Any kind of physical activity helps. The best exercise is the kind you enjoy and will actually do.   Improve your sleep   Getting better sleep is one of the best things you can do to improve your symptoms.    Caffeine, tobacco, and alcohol can cause interrupted sleep. Cutting down or quitting these can improve the quality of your sleep. If you can't quit caffeine completely, try avoiding it later in the day.    Set a regular bedtime, and allow a period of time to \"unwind\" before going to sleep.    Wake up at the same time every day.    Turn off or put away all electronic devices an hour before going to sleep.    Avoid reading, watching TV, or using electronic devices in bed.    As much as possible, keep your bedroom dark, cool, and quiet.    If you're struggling to sleep, don't stay in bed. Get up and go to a quiet spot. Read or do relaxation exercises. Then go back to bed and try again.   Try mindfulness exercises    If your mind races, focus on your body instead. Breathe in slowly through your nose and out through your mouth.    Some people find that meditation helps with stress and mood symptoms. If you want to try meditation but don't know how, mobile apps can get you started.   Use your creativity    When you are experiencing significant stress, you can often spend too much time thinking. Making something with your hands can use your " thoughts in a positive way and bring some relief. It also helps you move from inaction to action. Activities like writing in a journal, gardening, woodworking, cooking, or doing a craft can help focus your mind.   Connect with others    If you can't meet in person, send a short text or email to someone just to keep in touch.    If you use social media, notice how it makes you feel. If certain topics or people have a negative effect on your mood, unfollow them. Limit the time you spend on social media. Active participation can be better than passive scrolling through a feed.    If you're up to it, try volunteering. Or just do something kind for someone. This can lift your mood as well as theirs.   Your provider   Your diagnosis was provided by VIKTORIYA Silva, a member of your trusted care team at [facility].   If you have any questions, call us at [phone]  .

## 2024-07-22 ENCOUNTER — TELEPHONE (OUTPATIENT)
Dept: PSYCHIATRY | Facility: CLINIC | Age: 24
End: 2024-07-22

## 2024-07-22 ENCOUNTER — TELEMEDICINE (OUTPATIENT)
Dept: PSYCHIATRY | Facility: CLINIC | Age: 24
End: 2024-07-22
Payer: COMMERCIAL

## 2024-07-22 DIAGNOSIS — F41.0 PANIC DISORDER: Primary | ICD-10-CM

## 2024-07-22 PROCEDURE — 1159F MED LIST DOCD IN RCRD: CPT | Performed by: NURSE PRACTITIONER

## 2024-07-22 PROCEDURE — 1160F RVW MEDS BY RX/DR IN RCRD: CPT | Performed by: NURSE PRACTITIONER

## 2024-07-22 PROCEDURE — 90792 PSYCH DIAG EVAL W/MED SRVCS: CPT | Performed by: NURSE PRACTITIONER

## 2024-07-22 RX ORDER — PROPRANOLOL HYDROCHLORIDE 10 MG/1
TABLET ORAL
Qty: 30 TABLET | Refills: 0 | Status: SHIPPED | OUTPATIENT
Start: 2024-07-22

## 2024-07-22 NOTE — PATIENT INSTRUCTIONS
For concerns or needing assistance call the Behavioral Health [facility] at [phone]  Should you ever need assistance or just want to reach out to someone when your behavioral health provider is not available due to the office being closed you can contact [email or web address]. Just text HOME to [phone] and someone will reach out to you within a few minutes.  This is a 24/7 help line and they are open holidays. Be sure and let us know as soon as our office opens so we can get you in for a follow up.  As always, go to the closest emergency room or call 911 if you feel you need immediate assistance.    Medication refills:- Refill requests are addressed M-Th. No refills will be sent in on Fridays, weekends or federal holidays.   Please be aware of your need for refills and call one week before needing medication refilled so it can be filled in a timely manner.   For test anxiety- try propranolol 10mg tablet- take one oral tablet one hour prior to event for anxiety

## 2024-07-22 NOTE — LETTER
[facility]  [address] [place] [address]  [phone]  Dept: [phone]    24    RE:   Loni Ortiz   :  2000    To Whom It May Concern:    This letter is to verify that Loni has a diagnosis of anxiety and I am requesting ADA accommodations for testing.  This may include, but is not limited to:  A noguera during testing. This could include someone to read the questions and answers verbally to Loni.   Extended time to take the test, for example if the test time is set at 1 hour, Loni would require up to 2 hours for completion.  A distraction- free environment, this could include a room away from other students, and area away from high traffic such as an area away from office areas, public areas.      Thank you for your help in this matter.     Sincerely,           VIKTORIYA Kelly

## 2024-07-22 NOTE — PROGRESS NOTES
"This provider is located at home address in [place] for [facility], [address]    [facility], [address] using a secure MyChart Video Visit through [employer]. Patient is being seen remotely via telehealth at their home address in [place], and stated they are in a secure environment for this session. The patient's condition being diagnosed/treated is appropriate for telemedicine. The provider identified herself as well as her credentials.   The patient, and/or patients guardian, consent to be seen remotely, and when consent is given they understand that the consent allows for patient identifiable information to be sent to a third party as needed.   They may refuse to be seen remotely at any time. The electronic data is encrypted and password protected, and the patient and/or guardian has been advised of the potential risks to privacy not withstanding such measures.   PT Identifiers used: Name and .    You have chosen to receive care through a telehealth visit.  Do you consent to use a video/audio connection for your medical care today? Yes      Subjective   Loni Ortiz is a 23 y.o. female who presents today for initial evaluation     Chief Complaint:  \"test anxiety\"    History of Present Illness:     History of Present Illness  Patient reported for appointment from her home with above keep complaint.  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  She was in the program in the spring but had withdrawal due to the anxiety.  She will be starting back on , this will be her first semester.  She reports history of symptoms involving testing, getting stomachaches, she has to shake her legs, she feels panicky.  She feels like she does not get enough instruction from the professors.  She reports they have to learn a lot on their own.  She previously went to [employer] for 1 semester but the class size was too large.  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  Patient's second language is English which she did not really start learning until she entered into the public school system.  Her parents are from [place] and will not allow English to be spoken at home.  Discussed with patient getting ADA accommodations, she reports yes she understands about the accommodations as her friend has 1 and gets extra time for taking a test.  Patient does report sometimes she has difficulty with reading comprehension.  She was going to go for psychological testing to rule out ADHD but it was going to cost about $2000 and she cannot afford this.         Patient Health Questionnaire-9 (PHQ-9) (Depression Screening Tool)  Little interest or pleasure in doing things? (P) 0-->not at all   Feeling down, depressed, or hopeless? (P) 0-->not at all   Trouble falling or staying asleep, or sleeping too much? (P) 0-->not at all   Feeling tired or having little energy? (P) 0-->not at all   Poor appetite or overeating? (P) 0-->not at all   Feeling bad about yourself - or that you are a failure or have let yourself or your family down? (P) 0-->not at all   Trouble concentrating on things, such as reading the newspaper or watching television? (P) 0-->not at all   Moving or speaking so slowly that other people could have noticed? Or the opposite - being so fidgety or restless that you have been moving around a lot more than usual? (P) 0-->not at all   Thoughts that you would be better off dead, or of hurting yourself in some way? (P) 0-->not at all   PHQ-9 Total Score (P) 0   If you checked off any problems, how difficult have these problems made it for you to do your work, take care of things at home, or get along with other people? (P) not difficult at all     PHQ-9 Total  Score: (P) 0      Generalized Anxiety Disorder 7-Item (MARCEL-7) Screening Tool  Feeling nervous, anxious or on edge: (P) Several days  Not being able to stop or control worrying: (P) Not at all  Worrying too much about different things: (P) Not at all  Trouble Relaxing: (P) Not at all  Being so restless that it is hard to sit still: (P) Not at all  Feeling afraid as if something awful might happen: (P) Not at all  Becoming easily annoyed or irritable: (P) Not at all  MARCEL 7 Total Score: (P) 1  If you checked any problems, how difficult have these problems made it for you to do your work, take care of things at home, or get along with other people: (P) Not difficult at all    Last Menstrual Period:   07/19/2024    The following portions of the patient's history were reviewed and updated as appropriate: allergies, current medications, past family history, past medical history, past social history, past surgical history and problem list.    Past Psychiatric History:  Began Treatment: With this visit today 7/22/2024  Diagnoses:Anxiety  Psychiatrist:Denies  Therapist:Denies  Admission History:Denies  Medication Trials: None  Self Harm: Denies  Suicide Attempts:Denies  History of febrile seizure when she was very young.  Denies history of head injury or seizure  Denies history of psychosis or hallucinations    Past Medical History:  Past Medical History:   Diagnosis Date    Foreign body aspiration     Patient reported getting choked on a nickel when she was very small    Seizures     febrile when young child       Substance Abuse History:   Types:Denies all, including illicit       Social History:  Social History     Socioeconomic History    Marital status: Single    Highest education level: Some college, no degree   Tobacco Use    Smoking status: Never    Smokeless tobacco: Never   Vaping Use    Vaping status: Never Used   Substance and Sexual Activity    Alcohol use: Never    Drug use: Never    Sexual activity: Yes      Partners: Male     Birth control/protection: I.U.D.   Patient is currently a nursing student at [employer].  No legal history.  Support network consists of family and friends.  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    Family History:  History reviewed. No pertinent family history.    Past Surgical History:  History reviewed. No pertinent surgical history.    Problem List:  Patient Active Problem List   Diagnosis    SALINAS (dyspnea on exertion)    Exercise-induced asthma    Dysmenorrhea    Dysmenorrhea in adolescent    Acne    Encounter for well child check without abnormal findings    Needs flu shot    Need for prophylactic vaccination against hepatitis A    Need for HPV vaccination    Need for meningococcus vaccine    Heart murmur    Epigastric abdominal pain    Epigastric pain    RLQ abdominal pain       Allergy:   No Known Allergies     Current Medications:   Current Outpatient Medications   Medication Sig Dispense Refill    propranolol (INDERAL) 10 MG tablet Take one oral tablet one hour prior to event for anxiety. 30 tablet 0     No current facility-administered medications for this visit.       Review of Systems:    Review of Systems   Psychiatric/Behavioral:  The patient is nervous/anxious.    All other systems reviewed and are negative.        Physical Exam:   Physical Exam  Vitals reviewed.   Constitutional:       Appearance: Normal appearance. She is well-developed and well-groomed.   Neurological:      Mental Status: She is alert.   Psychiatric:         Attention and Perception: Attention and perception normal.         Mood and Affect: Affect normal. Mood is anxious.         Speech: Speech normal.         Behavior: Behavior normal. Behavior is cooperative.         Thought Content: Thought content normal.         Cognition and Memory: Cognition and memory normal.         Judgment: Judgment normal.         Vitals:  Last menstrual period 07/19/2024. There is no height or weight on file to calculate  BMI.  Due to extenuating circumstances and possible current health risks associated with the patient being present in a clinical setting (with current health restrictions in place in regards to possible COVID 19 transmission/exposure), the patient was seen remotely today via a MyChart Video Visit through [facility] and telephone encounter.  Unable to obtain vital signs due to nature of remote visit.  Height stated at 62 inches.  Weight stated at 143 pounds.    Last 3 Blood Pressure Readings:  BP Readings from Last 3 Encounters:   05/15/23 100/60   11/08/22 114/64   10/05/21 122/80          Mental Status Exam:   Hygiene:   good  Cooperation:  Cooperative  Eye Contact:  Good  Psychomotor Behavior:  Appropriate  Affect:  Full range  Mood: anxious  Hopelessness: Denies  Speech:  Normal  Thought Process:  Goal directed and Linear  Thought Content:  Normal  Suicidal:  None  Homicidal:  None  Hallucinations:  None  Delusion:  None  Memory:  Intact  Orientation:  Person, Place, Time, and Situation  Reliability:  good  Insight:  Good  Judgement:  Good  Impulse Control:  Good  Physical/Medical Issues:  No        Lab Results:   Lab Requisition on 03/13/2024   Component Date Value Ref Range Status    QuantiFERON Incubation 03/13/2024 Incubation performed.   Final    QUANTIFERON-TB GOLD PLUS 03/13/2024 Negative  Negative Final    No response to M tuberculosis antigens detected.  Infection with M tuberculosis is unlikely, but high risk  individuals should be considered for additional testing  (ATS/IDSA/CDC Clinical Practice Guidelines, 2017). The  reference range is an Antigen minus Nil result of <0.35 IU/mL.  Chemiluminescence immunoassay methodology    QuantiFERON Criteria 03/13/2024 Comment   Final    QuantiFERON-TB Gold Plus is a qualitative indirect test for  M tuberculosis infection (including disease) and is intended for use  in conjunction with risk assessment, radiography, and other medical  and diagnostic evaluations. The  "QuantiFERON-TB Gold Plus result is  determined by subtracting the Nil value from either TB antigen (Ag)  value. The Mitogen tube serves as a control for the test.    QUANTIFERON TB1 AG VALUE 03/13/2024 0.06  IU/mL Final    QUANTIFERON TB2 AG VALUE 03/13/2024 0.07  IU/mL Final    QuantiFERON Nil Value 03/13/2024 0.07  IU/mL Final    QuantiFERON Mitogen Value 03/13/2024 >10.00  IU/mL Final              Assessment & Plan   Diagnoses and all orders for this visit:    1. Panic disorder (Primary)  -     propranolol (INDERAL) 10 MG tablet; Take one oral tablet one hour prior to event for anxiety.  Dispense: 30 tablet; Refill: 0        Visit Diagnoses:    ICD-10-CM ICD-9-CM   1. Panic disorder  F41.0 300.01         GOALS:  Short Term Goals: Patient will be compliant with medication, and patient will have no significant medication related side effects.  Patient will be engaged in psychotherapy as indicated.  Patient will report subjective improvement of symptoms.  Long term goals: To stabilize mood and treat/improve subjective symptoms, the patient will stay out of the hospital, the patient will be at an optimal level of functioning, and the patient will take all medications as prescribed.  The patient/guardian verbalized understanding and agreement with goals that were mutually set.    RISK ASSESSMENT  Current harm-to-self risk is reported by pt as \"none.\"  Current xoxe-vk-aemoey risk is reported by pt as \"none.\"   No suicidal thoughts, intent, plan is appreciated by this provider on this date of exam.   No homicidal thoughts, intent, plan is appreciated by this provider on this date.    TREATMENT PLAN: Continue supportive psychotherapy efforts and medications as indicated.  Pharmacological and Non-Pharmacological treatment options discussed during today's visit. Patient/Guardian acknowledged and verbally consented with current treatment plan and was educated on the importance of compliance with treatment and follow-up " appointments.      MEDICATION ISSUES:  Discussed medication options and treatment plan of prescribed medication as well as the risks, benefits, any black box warnings, and side effects including potential falls, possible impaired driving, and metabolic adversities among others. Patient is agreeable to call the office with any worsening of symptoms or onset of side effects, or if any concerns or questions arise.  The contact information for the office is made available to the patient. Patient is agreeable to call 911 or go to the nearest ER should they begin having any SI/HI, or if any urgent concerns arise. No medication side effects or related complaints today.     Letter for ADA accommodations sent to patient's [facility]  Start propranolol as below  MEDS ORDERED DURING VISIT:  New Medications Ordered This Visit   Medications    propranolol (INDERAL) 10 MG tablet     Sig: Take one oral tablet one hour prior to event for anxiety.     Dispense:  30 tablet     Refill:  0       Follow Up Appointment:   Return in about 7 weeks (around 9/9/2024) for Recheck, Video visit.               This document has been electronically signed by VIKTORIYA Roblero  July 22, 2024 17:38 EDT    Dictated Utilizing Dragon Dictation: Part of this note may be an electronic transcription/translation of spoken language to printed text using the Dragon Dictation System.

## 2024-08-18 DIAGNOSIS — F41.0 PANIC DISORDER: ICD-10-CM

## 2024-08-19 RX ORDER — PROPRANOLOL HYDROCHLORIDE 10 MG/1
TABLET ORAL
Qty: 30 TABLET | Refills: 0 | Status: SHIPPED | OUTPATIENT
Start: 2024-08-19

## 2025-05-05 ENCOUNTER — OFFICE VISIT (OUTPATIENT)
Dept: FAMILY MEDICINE CLINIC | Facility: CLINIC | Age: 25
End: 2025-05-05
Payer: COMMERCIAL

## 2025-05-05 VITALS
SYSTOLIC BLOOD PRESSURE: 116 MMHG | BODY MASS INDEX: 27.31 KG/M2 | HEIGHT: 62 IN | TEMPERATURE: 97.3 F | WEIGHT: 148.4 LBS | OXYGEN SATURATION: 99 % | DIASTOLIC BLOOD PRESSURE: 66 MMHG | HEART RATE: 89 BPM | RESPIRATION RATE: 22 BRPM

## 2025-05-05 DIAGNOSIS — R59.1 LYMPHADENOPATHY OF HEAD AND NECK: Primary | ICD-10-CM

## 2025-05-05 PROCEDURE — 99213 OFFICE O/P EST LOW 20 MIN: CPT | Performed by: FAMILY MEDICINE

## 2025-05-05 RX ORDER — SERTRALINE HYDROCHLORIDE 25 MG/1
25 TABLET, FILM COATED ORAL DAILY
COMMUNITY

## 2025-05-05 RX ORDER — FLUCONAZOLE 200 MG/1
TABLET ORAL
COMMUNITY
Start: 2024-12-20

## 2025-05-20 PROBLEM — R59.1 LYMPHADENOPATHY OF HEAD AND NECK: Status: ACTIVE | Noted: 2025-05-20

## 2025-05-20 NOTE — PROGRESS NOTES
"Subjective   Loni Ortiz is a 24 y.o. female.     Cyst  Symptoms are new.   Onset was in the past 7 days.   Pertinent negative symptoms include no abdominal pain, no chest pain, no chills, no congestion, no fatigue, no headaches, no joint swelling, no rash and no sore throat.   Aggravating factors include nothing.   Treatments tried include nothing.        The following portions of the patient's history were reviewed and updated as appropriate: allergies, current medications, past family history, past medical history, past social history, past surgical history, and problem list.    Review of Systems   Constitutional:  Negative for chills and fatigue.   HENT:  Negative for congestion and sore throat.    Cardiovascular:  Negative for chest pain.   Gastrointestinal:  Negative for abdominal pain.   Skin:  Negative for rash.   Neurological:  Negative for headaches.       Objective     Vitals:    05/05/25 1304   BP: 116/66   BP Location: Left arm   Patient Position: Sitting   Cuff Size: Adult   Pulse: 89   Resp: 22   Temp: 97.3 °F (36.3 °C)   TempSrc: Temporal   SpO2: 99%   Weight: 67.3 kg (148 lb 6.4 oz)   Height: 157.5 cm (62\")       Physical Exam  Vitals and nursing note reviewed.   Constitutional:       General: She is not in acute distress.     Appearance: She is well-developed. She is not diaphoretic.   HENT:      Head: Normocephalic and atraumatic.      Right Ear: Tympanic membrane and external ear normal.      Left Ear: Tympanic membrane and external ear normal.      Nose: No congestion or rhinorrhea.   Eyes:      General: Lids are normal. No scleral icterus.        Right eye: No discharge.         Left eye: No discharge.      Conjunctiva/sclera: Conjunctivae normal.      Pupils: Pupils are equal, round, and reactive to light.   Neck:      Thyroid: No thyroid mass or thyromegaly.      Vascular: No carotid bruit or JVD.      Trachea: No tracheal deviation.   Cardiovascular:      Rate and Rhythm: Normal rate and " regular rhythm.      Heart sounds: Normal heart sounds. No murmur heard.     No friction rub. No gallop.   Pulmonary:      Effort: Pulmonary effort is normal. No respiratory distress.      Breath sounds: Normal breath sounds. No decreased breath sounds, wheezing, rhonchi or rales.   Chest:      Chest wall: No tenderness.   Abdominal:      General: Bowel sounds are normal. There is no distension.      Palpations: Abdomen is soft. There is no mass.      Tenderness: There is no abdominal tenderness. There is no guarding or rebound.      Hernia: No hernia is present.   Musculoskeletal:         General: Normal range of motion.   Lymphadenopathy:      Cervical: No cervical adenopathy.      Upper Body:      Right upper body: No supraclavicular adenopathy.      Left upper body: No supraclavicular adenopathy.   Skin:     Findings: No bruising, erythema or rash.      Nails: There is no clubbing.   Neurological:      Mental Status: She is alert and oriented to person, place, and time.      Cranial Nerves: No cranial nerve deficit.      Deep Tendon Reflexes: Reflexes are normal and symmetric. Reflexes normal.   Psychiatric:         Speech: Speech normal.         Behavior: Behavior normal.         Thought Content: Thought content normal.         Judgment: Judgment normal.         Assessment & Plan     Problem List Items Addressed This Visit          Symptoms and Signs    Lymphadenopathy of head and neck - Primary    Relevant Medications    amoxicillin-clavulanate (AUGMENTIN) 875-125 MG per tablet